# Patient Record
Sex: MALE | Race: WHITE
[De-identification: names, ages, dates, MRNs, and addresses within clinical notes are randomized per-mention and may not be internally consistent; named-entity substitution may affect disease eponyms.]

---

## 2017-12-15 ENCOUNTER — HOSPITAL ENCOUNTER (EMERGENCY)
Dept: HOSPITAL 62 - ER | Age: 42
Discharge: HOME | End: 2017-12-15
Payer: SELF-PAY

## 2017-12-15 VITALS — DIASTOLIC BLOOD PRESSURE: 60 MMHG | SYSTOLIC BLOOD PRESSURE: 128 MMHG

## 2017-12-15 DIAGNOSIS — R42: ICD-10-CM

## 2017-12-15 DIAGNOSIS — I49.3: ICD-10-CM

## 2017-12-15 DIAGNOSIS — J98.11: Primary | ICD-10-CM

## 2017-12-15 DIAGNOSIS — M42.00: ICD-10-CM

## 2017-12-15 DIAGNOSIS — R53.1: ICD-10-CM

## 2017-12-15 LAB
ABSOLUTE EOSINOPHILS# (MANUAL): 0.2 10^3/UL (ref 0–0.6)
ALBUMIN SERPL-MCNC: 4.4 G/DL (ref 3.5–5)
ALP SERPL-CCNC: 45 U/L (ref 38–126)
ALT SERPL-CCNC: 59 U/L (ref 21–72)
ANION GAP SERPL CALC-SCNC: 11 MMOL/L (ref 5–19)
APTT BLD: 25.1 SEC (ref 23.5–35.8)
AST SERPL-CCNC: 23 U/L (ref 17–59)
BASOPHILS NFR BLD MANUAL: 0 % (ref 0–2)
BILIRUB DIRECT SERPL-MCNC: 0.2 MG/DL (ref 0–0.4)
BILIRUB SERPL-MCNC: 0.2 MG/DL (ref 0.2–1.3)
BUN SERPL-MCNC: 15 MG/DL (ref 7–20)
CALCIUM: 9 MG/DL (ref 8.4–10.2)
CHLORIDE SERPL-SCNC: 102 MMOL/L (ref 98–107)
CK MB SERPL-MCNC: 2.51 NG/ML (ref ?–4.55)
CK SERPL-CCNC: 250 U/L (ref 55–170)
CO2 SERPL-SCNC: 28 MMOL/L (ref 22–30)
CREAT SERPL-MCNC: 0.98 MG/DL (ref 0.52–1.25)
EOSINOPHIL NFR BLD MANUAL: 2 % (ref 0–6)
ERYTHROCYTE [DISTWIDTH] IN BLOOD BY AUTOMATED COUNT: 13.8 % (ref 11.5–14)
GLUCOSE SERPL-MCNC: 92 MG/DL (ref 75–110)
HCT VFR BLD CALC: 41.9 % (ref 37.9–51)
HGB BLD-MCNC: 14.3 G/DL (ref 13.5–17)
HGB HCT DIFFERENCE: 1
MCH RBC QN AUTO: 30.1 PG (ref 27–33.4)
MCHC RBC AUTO-ENTMCNC: 34 G/DL (ref 32–36)
MCV RBC AUTO: 89 FL (ref 80–97)
NEUTS BAND NFR BLD MANUAL: 1 % (ref 3–5)
NRBC BLD AUTO-RTO: 1 /100 WBC
POTASSIUM SERPL-SCNC: 3.9 MMOL/L (ref 3.6–5)
PROT SERPL-MCNC: 7.5 G/DL (ref 6.3–8.2)
PROTHROMBIN TIME: 12.8 SEC (ref 11.4–15.4)
RBC # BLD AUTO: 4.73 10^6/UL (ref 4.35–5.55)
SODIUM SERPL-SCNC: 141 MMOL/L (ref 137–145)
TOTAL CELLS COUNTED BLD: 100
TOXIC GRANULES BLD QL SMEAR: SLIGHT
TROPONIN I SERPL-MCNC: < 0.012 NG/ML
VARIANT LYMPHS NFR BLD MANUAL: 35 % (ref 13–45)
WBC # BLD AUTO: 11.4 10^3/UL (ref 4–10.5)

## 2017-12-15 PROCEDURE — 36415 COLL VENOUS BLD VENIPUNCTURE: CPT

## 2017-12-15 PROCEDURE — 80053 COMPREHEN METABOLIC PANEL: CPT

## 2017-12-15 PROCEDURE — 93005 ELECTROCARDIOGRAM TRACING: CPT

## 2017-12-15 PROCEDURE — 84484 ASSAY OF TROPONIN QUANT: CPT

## 2017-12-15 PROCEDURE — 70450 CT HEAD/BRAIN W/O DYE: CPT

## 2017-12-15 PROCEDURE — 82553 CREATINE MB FRACTION: CPT

## 2017-12-15 PROCEDURE — 85025 COMPLETE CBC W/AUTO DIFF WBC: CPT

## 2017-12-15 PROCEDURE — 99285 EMERGENCY DEPT VISIT HI MDM: CPT

## 2017-12-15 PROCEDURE — 71010: CPT

## 2017-12-15 PROCEDURE — 93010 ELECTROCARDIOGRAM REPORT: CPT

## 2017-12-15 PROCEDURE — 71275 CT ANGIOGRAPHY CHEST: CPT

## 2017-12-15 PROCEDURE — 85730 THROMBOPLASTIN TIME PARTIAL: CPT

## 2017-12-15 PROCEDURE — 82550 ASSAY OF CK (CPK): CPT

## 2017-12-15 PROCEDURE — 85610 PROTHROMBIN TIME: CPT

## 2017-12-15 PROCEDURE — 74174 CTA ABD&PLVS W/CONTRAST: CPT

## 2017-12-15 NOTE — ER DOCUMENT REPORT
ED General





- General


Mode of Arrival: Ambulatory


Information source: Patient


TRAVEL OUTSIDE OF THE [place] IN LAST 30 DAYS: No





- HPI


Onset: This morning


Associated symptoms: Chest pain





<NEVIN MONTERROSO - Last Filed: 12/15/17 23:48>





<VIN ADAME - Last Filed: 12/16/17 01:20>





- General


Chief Complaint: S/S of Possible Stroke


Stated Complaint: STROKE LIKE SYMPTOMS


Time Seen by Provider: 12/15/17 19:39


Notes: 


Patient is a 42 year old male with a history of Bronchitis and spinal problems 

presents to the emergency department complaining of chest pain and weakness in 

his extremities bilaterally. Patient states at around 1730 he was beginning to 

get out of his car when his legs suddenly felt weak and he fell back into his 

car. Patient also states that he could not speak when attempting to grab someone

's attention. Patient states that he was able to walk shortly after feeling 

weak. Patient states that he went to Urgent Care first then proceeded to come 

to the emergency department. 








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 

with Bronchitis. Patient states that his legs with oftern go numb due to 

Scheurmann's Kyphosis. Patient states he feels as though he did not eat "well" 

today after having 3 donuts and chicken and pasta. 


 (NEVIN MONTERROSO)





Weakness was bilateral, no focal neurologic deficits, states that he wanted to 

speak but did not think the words would come out correctly so he did not. (

VIN ADAME)





- Related Data


Allergies/Adverse Reactions: 


 





No Known Allergies Allergy (Verified 11/09/13 17:51)


 











Past Medical History





- General


Information source: Patient





- Social History


Smoking Status: Never Smoker


Cigarette use (# per day): No


Chew tobacco use (# tins/day): No


Smoking Education Provided: No


Frequency of alcohol use: Rare


Drug Abuse: None


Family History: Reviewed & Not Pertinent


Musculoskeltal Medical History: Reports Other - Scheuermann's Kyphosis


Traumatic Medical History: Reports: Hx Fractures


Past Surgical History: Reports: Hx Oral Surgery, Hx Orthopedic Surgery - t9-t12 

fusion, L3-L5 laminectomy





- Immunizations


Hx Diphtheria, Pertussis, Tetanus Vaccination: No





<NEVIN MONTERROSO - Last Filed: 12/15/17 23:48>





Review of Systems





- Review of Systems


Constitutional: No symptoms reported


EENT: No symptoms reported


Cardiovascular: See HPI, Chest pain


Respiratory: No symptoms reported


Gastrointestinal: No symptoms reported


Genitourinary: No symptoms reported


Male Genitourinary: No symptoms reported


Musculoskeletal: No symptoms reported


Skin: No symptoms reported


Hematologic/Lymphatic: No symptoms reported


Neurological/Psychological: See HPI, Weakness


-: Yes All other systems reviewed and negative





<NEVIN MONTERROSO - Last Filed: 12/15/17 23:48>





Physical Exam





<NEVIN MONTERROSO - Last Filed: 12/15/17 23:48>





<VIN ADAME - Last Filed: 12/16/17 01:20>





- Vital signs


Vitals: 


 











Temp Pulse BP Pulse Ox


 


 98.1 F   94   157/90 H  98 


 


 12/15/17 19:23  12/15/17 19:23  12/15/17 19:23  12/15/17 19:23














- Notes


Notes: 





GENERAL: Alert, interacts well. No acute distress.


HEAD: Normocephalic, atraumatic.


EYES: Pupils equal, round, and reactive to light. Extraocular movements intact.


ENT: Oral mucosa moist, tongue midline.


NECK: Full range of motion. Supple. Trachea midline.


LUNGS: Clear to auscultation bilaterally, no wheezes, rales, or rhonchi. No 

respiratory distress.


HEART: Occasional ectopic beats. No murmurs, gallops, or rubs.


ABDOMEN: Soft, non-tender. Non-distended. Bowel sounds present in all 4 

quadrants.


EXTREMITIES: Moves all 4 extremities spontaneously. No edema, radial and 

dorsalis pedis pulses 2/4 bilaterally. No cyanosis.


NEUROLOGICAL: Alert and oriented x3. Normal speech. cranial nerves II through 

XII grossly intact. Biceps and patellar DTRs 2+ bilaterally. See NIH scale.


PSYCH: Normal affect, normal mood.


SKIN: Warm, dry, normal turgor. No rashes or lesions noted.


 (NEVIN MONTERROSO)





Course





- Laboratory


Result Diagrams: 


 12/15/17 20:15





 12/15/17 20:15





<NEVIN MONTERROSO - Last Filed: 12/15/17 23:48>





- Laboratory


Result Diagrams: 


 12/15/17 20:15





 12/15/17 20:15





<VIN ADAME - Last Filed: 12/16/17 01:20>





- Re-evaluation


Re-evalutation: 





12/15/17 23:35


CBC shows slight leukocytosis 11.4, coags normal, CMP grossly unremarkable, 

cardiac enzymes negative, CT scan of the head was initially ordered due to 

stroke protocol, this was negative for acute intracranial process, chest x-ray 

shows similar mild elevation of the right hemidiaphragm and right basilar 

scarring, no acute process.  Given the bilateral lower extremity weakness 

associated with chest pain and near syncopal episode patient did have a CT 

angiogram of the chest and abdomen looking for possible aortic dissection, this 

showed no aneurysm and no dissection, there is mild subpleural scarring and 

subsegmental atelectasis in the right lower and middle lobe.





Patient's symptoms are not consistent with stroke as they are symmetric and by 

lateral.  Discussed with patient that his symptoms more likely represent near 

syncopal episode.  Patient has not been hypoxic while he has been here, has 

evidence of atelectasis but no pneumonia.  No indication for antibiotics at 

this time.  Patient has had multiple asymptomatic PVCs, encouraged to follow-up 

with cardiologist as an outpatient for a Holter monitor.  States he has been 

told in the past that he has had PVCs however given the fact that he felt 

somewhat dizzy today I feel it is prudent to have this rechecked.





Patient will be discharged home with incentive spirometer.  No evidence of 

acute coronary syndrome, patient's chest pain he related was directly related 

to a coughing episode.  EKG is nonischemic. (VIN ADAME)





- Vital Signs


Vital signs: 


 











Temp Pulse Resp BP Pulse Ox


 


 98.1 F   81   20   128/60 H  100 


 


 12/15/17 19:23  12/15/17 22:45  12/15/17 23:31  12/15/17 23:31  12/15/17 23:31














- Laboratory


Laboratory results interpreted by me: 


 











  12/15/17 12/15/17





  20:15 20:15


 


WBC  11.4 H 


 


Band Neutrophils %  1 L 


 


Creatine Kinase   250 H














- EKG Interpretation by Me


Additional EKG results interpreted by me: 





12/15/17 23:37


EKG shows sinus rhythm rate 91, multiple PVCs, no ST segment elevations or 

depressions, isolated T-wave inversions in lead III, normal axis, normal 

intervals per my interpretation. (VIN ADAME)





Discharge





<NEVIN MONTERROSO - Last Filed: 12/15/17 23:48>





<VIN ADAME - Last Filed: 12/16/17 01:20>





- Discharge


Clinical Impression: 


 Dizziness, Atelectasis of right lung, PVC (premature ventricular contraction)





Condition: Stable


Disposition: HOME, SELF-CARE


Additional Instructions: 


You have atelectasis of your right middle and lower lobe.  This is likely 

related to your prior surgery and your kyphosis.  It may be worsening some of 

your dizziness today.  There is no sign of aortic dissection or pneumonia.  

Please make sure that you use the incentive spirometer to take 10 deep breaths 

every hour while you are awake until your cough resolves.  Should your cough 

worsen, you develop a fever or you pass out please return to the emergency 

department.





For your premature ventricular contractions (PVC) please follow-up with a 

cardiologist as an outpatient.


Referrals: 


WILLIAM HERNANDEZ MD [Primary Care Provider] - Follow up as needed


GIANNA YEE MD [ACTIVE STAFF] - Follow up as needed


Scribe Attestation: 





12/16/17 01:20


I personally performed the services described in the documentation, reviewed 

and edited the documentation which was dictated to the scribe in my presence, 

and it accurately records my words and actions. (VIN ADAME)





ED NIH Stroke Scale





- NIH Stroke Scale


*: 1. NIH scale should be completed with appropriate accompanying assessment 

tools.


*: 2. The NIH should reflect what the patient is capable of doing and should 

not be coached by the clinician.


1a. Level of Consciousness: 0=Alert;keenly responsive


-: 1=Drowsy


-: 2=Obtunded


-: 3=Coma/unresponsive or reflex to noxious stimuli.


1a. Responses: 0


1b. Orientation Questions: a. What month is it?


-: b. How old are you?


-: 0=Answers both questions correctly.


-: 1=Answers one question correctly or patient is intubated or has orotracheal 

trauma.


-: 2=Answers neither question correctly.


1b. Responses: 0


1c. Response to commands: a. Open and close eyes?


-: b.  and release hand?


-: Credit is given despite weakness. Demonstration of task is permitted. 

Substitute command if hands cannot be used.


-: 0=Performs both tasks correctly


-: 1=Performs one task correctly


-: 2=Performs neither task correctly


1c. Responses: 0


2. Gaze: Establish eye contact and instruct patient to "Follow my finger"


-: 0=Normal


-: 1=Partial gaze palsy. Gaze is abnormal in one or both eyes, but where forced 

deviation or total gaze paresis is not present.


-: 2=Forced deviation or total gaze paresis.


2. Responses: 0


3. Visual Fields: Sees fingers in all four quadrants.


-: 0=No visual loss.


-: 1=Partial hemianopsia.


-: 2=Complete hemianopsia.


-: 3=Bilateral hemianopsia (including Cortical blindness)


3. Responses: 0


4. Facial Movement: Instruct patient to:


-: a. Show me your teeth


-: b. Raise your eyebrows


-: c. Close your eyes


-: d. Smile


-: 0=Normal symmetrical movement


-: 1=Minor paralysis (flattened nasolabial fold, asymmetry on smiling).


-: 2=Partial paralysis (total or near total paralysis of lower face).


-: 3=Complete paralysis of upper and lower face


4. Responses: 0


5. Motor functions (left arm): Alternate sides and extend each arm with palms 

down (90 degrees if sitting or 45 degrees for supine).


-: 0=No drift;limb holds for full 10 seconds.


-: 1=Drift; limb holds but drifts down before full 10 seconds, but does not hit 

bed.


-: 2=Some effort against gravity; limb cannot get to or maintain position.


-: 3=No effort against gravity; limb falls.


-: 4=No movement.


-: UN=Amputation, joint fusion, explain in comments.


5. Responses (left arm): 0


5. Motor Functions (right arm): Alternate sides and extend each arm with palms 

down (90 degrees if sitting or 45 degrees for supine).


-: 0=No drift;limb holds for full 10 seconds.


-: 1=Drift; limb holds but drifts down before full 10 seconds, but does not hit 

bed.


-: 2=Some effort against gravity; limb cannot get to or maintain position.


-: 3=No effort against gravity; limb falls.


-: 4=No movement.


-: UN=Amputation, joint fusion, explain in comments.


5. Responses (right arm): 0


6. Motor Functions (left leg): With patient lying supine, alternate sides and 

extend each leg (30 degrees always while supine).


-: 0=No drift, leg holds position for full 5 seconds


-: 1=Drift; leg falls before full 5 seconds but does not hit bed.


-: 2=Some effort against gravity, leg falls to bed but some effort against 

gravity.


-: 3=No effort against gravity, leg falls to bed immediately.


-: 4=No movement.


-: UN=Amputation, joint fusion; explain in comments.


6. Responses (left leg): 0


6. Motor Functions (right leg): With patient lying supine, alternate sides and 

extend each leg (30 degrees always while supine).


-: 0=No drift, leg holds position for full 5 seconds


-: 1=Drift; leg falls before full 5 seconds but does not hit bed.


-: 2=Some effort against gravity, leg falls to bed but some effort against 

gravity.


-: 3=No effort against gravity, leg falls to bed immediately.


-: 4=No movement.


-: UN=Amputation, joint fusion; explain in comments.


6. Responses (right leg): 0


7. Limb Ataxia: With eyes open instruct patient to:


-: a. "Touch your finger to your nose".


-: b. "Touch your heel to your shin"


-: 0=Absent


-: 1=Present in one limb.


-: 2=Present in two limbs.


-: UN=Amputation or joint fusion; explain in comments.


7. Responses: 0


8. Sensory: Test sensation using pinprick or noxious stimuli.  Test as many 

body parts as possible.


-: 0=Normal;no sensory loss


-: 1=Mile to moderate sensory loss (patient feels pin prick but is less sharp 

on affected side).


-: 2=Severe or total sensory loss.


8. Responses: 0


9. Best Language: Instruct patient to:


-: a. "Describe what you see in this picture."


-: b. "Name the items in this picture."


-: c. "Read these sentences."


-: 0=No aphasia, normal


-: 1=Mild to moderate aphasia.


-: 2=Severe aphasia


-: 3=Mute, global aphasia, no usable speech or auditory comprehension.


9. Responses: 0


10. Articulation, Dysarthia: Instruct patient to:


-: "Read these words" or "Repeat these words"


-: 0=Normal


-: 1=Mild to moderate; patient may slur some words but can be understood 

without difficulty.


-: 2=Severe; patients speech so slurred as to be unintelligible in the absence 

of dysphasia.


-: UN=Intubated or other physical barrier, explain in comments.


10. Responses: 0


11. Extinction or inattention: 0=No abnormality


-: 1= Visual, tactile, auditory, spatial, or personal inattention or extinction 

to bilateral simulation in one or the sensory modalities.


-: 2=Profound howard-inattention or howard-inattention to more than one modality; 

does not recognize own hand.


11. Responses: 0


Total Score: 0





<NEVIN MONTERROSO - Last Filed: 12/15/17 23:48>





ED Alteplase Inc/Exc Criteria





- Date/Time patient last known well:


Date/Time: 12/15/2017, Unknown





- Date/Time patient arrived in ED:


_: 12/15/2017, 19:20





- Inclusion Criteria:


1: Patient presented to ED within 3 hours of acute ischemic stroke symptom onset

?


-: Yes


2: Did baseline CT exclude intracranial hemorrhage and/or other risk factors?


-: No


3: Is the age of the patient 18 years of age or greater?


-: Yes


*****: If any of the above questions are answered "NO" then stop, patient is 

not a candidate for Alteplase,


*****: If all of the above questions are answered "YES" then continue with 

Exclusion Criteria.





- Diagnosis of TIA:


-: Patient presented with transient symptoms that are now resolved and no other 

neurologic findings are currently present. List symptoms in comments.


-: Yes


-: Patient is NOT a candidate for tPA.


-: Yes


-:  ____(put name in comment)______ has been consulted for admission and 

continued evaluation of risk factor assessment.





<NEVIN MONTERROSO - Last Filed: 12/15/17 23:48>





- Inclusion Criteria:


1: Patient presented to ED within 3 hours of acute ischemic stroke symptom onset

?


2: Did baseline CT exclude intracranial hemorrhage and/or other risk factors?


-: Yes - correction to scribe documentation, answer is yes


3: Is the age of the patient 18 years of age or greater?


*****: If any of the above questions are answered "NO" then stop, patient is 

not a candidate for Alteplase,


*****: If all of the above questions are answered "YES" then continue with 

Exclusion Criteria.





- Exclusion Criteria:


1: Is there evidence of intracranial hemorrhage on baseline CT?


-: No


2: Is there suspicion of subarachnoid hemorrhage (even if CT negative)?


-: No


3: Is there a history of serious head trauma, recent previous stroke or MI 

within 3 months?


-: No


4: Does the patient have a clinical presentation consistent with MI or post-MI 

pericarditis?


-: No


5: Is there history of intracranial hemorrhage?


-: No


6: On repeated measurement is Systolic BP greater than 185mmHg or Diastolic BP 

greater that 110 mmHg and is aggressive treatment needed to reduce blood 

pressure to these limits (e.g. constant infusion of an anti-hypertensive)?


-: No


7: Did the patient awake with stroke symptoms?


-: No


8: Has the patient had a lumbar puncture or an arterial puncture at a non-

compressile site within 7 days?


-: No


9: With in the last 14 days did the patient have surgery or major trauma?


-: No


10: Is the patient pregnant or postpartum less than 2 weeks?


-: No


11: Was there any active bleeding or acute trauma?


-: No


12: Does the patient have intracranial neoplasm, arteriovenous malformation or 

aneurysm?


-: No


13: Does the patient have abnormal glucose (less than 50 or greater than 400mg/

dl)?  Record glucose in Comment.


-: No


14: Patient has rapidly improving symptoms at the time Alteplase is to be 

Administered.


-: Yes


15: Does the patient have any risks for bleeding, including but not limited to:


a.: Current use of Coumadin with PT greater than 15 seconds or INR greater than 

1.7.


b.: Current use of Pradaxa (Dabigatran).


c.: Heparin administereed within the past 48 hours and PTT elevated.


d.: Platelet count less than 100,000/mm.


e.: Major surgery or serious trauma within 14 days.


f.: Gastrointestinal or gynecological urinary bleeding within 14 days.


g.: Myocardial Infarction (MI) within 3 months.


-: No


*****: If the answer to any of the above questions is "YES" then stop, the 

patient is not a candidate for Alteplase.


*****: If the answer to all of the above questions is "NO" then the patient may 

be eligible for the Administration of Alteplase.


*****: If the patient is noted to have seizure activity at onset of Stroke 

symptoms; Consult Neurologist for further evaluation.





- The patient is:


-: Included and is eligible to receive Alteplase.  *Initiate bed placement at 

higher level of care*


--: No


Reviewd risks & benefits of thrombolytic therapy: I have reviewed the risks and 

benefits of thrombolytic therapy with the patient and/or his/her family.


-: Excluded and not eligible to receive Alteplase for the above exclusions.


--: Yes


-: Excluded and not eligible to receive Alteplase for other reasons (specify in 

comments):





- Diagnosis of TIA:


-: Patient presented with transient symptoms that are now resolved and no other 

neurologic findings are currently present. List symptoms in comments.


-: Patient is NOT a candidate for tPA.


-: Yes


-:  ____(put name in comment)______ has been consulted for admission and 

continued evaluation of risk factor assessment.


Comment: symptoms not consistent with stroke or TIA.  





<VIN ADAME - Last Filed: 12/16/17 01:20>





Scribe Documentation





- Scribe


Written by Wu:: Wu Villela, 12/15/2017 20:44


acting as scribe for :: [employer]





<NEVIN MONTERROSO - Last Filed: 12/15/17 23:48>

## 2017-12-15 NOTE — RADIOLOGY REPORT (SQ)
EXAM DESCRIPTION:  CTA CHEST; CTA ABDOMEN/PELVIS W   WO



COMPLETED DATE/TIME:  12/15/2017 9:42 pm



REASON FOR STUDY:  rule out dissection



COMPARISON:  None.



TECHNIQUE:  CT scan of the abdominal aorta extending to the iliac bifurcation performed with and with
out intravenous contrast using helical scanning technique with dynamic intravenous contrast injection
. Images reviewed with lung, soft tissue, and bone windows. Reconstructed coronal and sagittal MPR im
ages reviewed. All images stored on PACS.

Advanced 3D imaging as volume rendering, MIPS, SSD performed? yes

All CT scanners at this facility use dose modulation, iterative reconstruction, and/or weight based d
osing when appropriate to reduce radiation dose to as low as reasonably achievable (ALARA).

[facility]: Dose Right  [facility]: CareDose    [facility]: Dose Right    [facility]: Teradose 4D    [facility]: Smart Technologies



CONTRAST TYPE AND DOSE:  contrast/concentration: Isovue 370.00 mg/ml; Total Contrast Delivered: 75.0 
ml; Total Saline Delivered: 50.0 ml



RENAL FUNCTION:  GFR > 60.



LIMITATIONS:  None.



FINDINGS:  AORTA AND VESSELS: No aneurysm. No dissection. Renal arteries, SMA, celiac without stenosi
s.

LUNGS: Mild subpleural scarring-subsegmental atelectasis in the right lower lobe and middle lobe.  No
 consolidation or pleural effusion.

LIVER: Moderate fatty infiltration.  Normal size. No masses or dilated ducts.

SPLEEN: Normal size. No focal lesions.

PANCREAS: No masses. No significant calcifications. No adjacent inflammation or peripancreatic fluid 
collections. Pancreatic duct not dilated.

GALLBLADDER: No identified stones by CT criteria. No inflammatory changes to suggest cholecystitis.

ADRENAL GLANDS: No significant masses or asymmetry.

RIGHT KIDNEY AND URETER: No mass, calculi or urinary tract obstruction.

LEFT KIDNEY AND URETER: No mass, calculi or urinary tract obstruction.

RETROPERITONEUM: No retroperitoneal adenopathy, hemorrhage or masses.

BOWEL AND PERITONEAL CAVITY: No masses or inflammatory changes. No free fluid or peritoneal masses.

APPENDIX: Normal.

ABDOMINAL WALL: No masses. No hernias.

BONY STRUCTURES: No acute findings.  Multilevel posterior fusion hardware in the lower thoracic spine
.

3-D IMAGING: Confirms the above findings.

OTHER: No other significant finding.



IMPRESSION:  No aneurysm. No dissection.Mild subpleural scarring-subsegmental atelectasis in the righ
t lower lobe and middle lobe. No consolidation or pleural effusion.

No acute findings in the abdomen or pelvis.



TECHNICAL DOCUMENTATION:  JOB ID:  [id number]

[id number]

Quality ID # 436: Final reports with documentation of one or more dose reduction techniques (e.g., Au
tomated exposure control, adjustment of the mA and/or kV according to patient size, use of iterative 
reconstruction technique)

 2011 [employer]- All Rights Reserved

## 2017-12-15 NOTE — RADIOLOGY REPORT (SQ)
EXAM DESCRIPTION:  CTA CHEST; CTA ABDOMEN/PELVIS W   WO



COMPLETED DATE/TIME:  12/15/2017 9:42 pm



REASON FOR STUDY:  rule out dissection



COMPARISON:  None.



TECHNIQUE:  CT scan of the abdominal aorta extending to the iliac bifurcation performed with and with
out intravenous contrast using helical scanning technique with dynamic intravenous contrast injection
. Images reviewed with lung, soft tissue, and bone windows. Reconstructed coronal and sagittal MPR im
ages reviewed. All images stored on PACS.

Advanced 3D imaging as volume rendering, MIPS, SSD performed? yes

All CT scanners at this facility use dose modulation, iterative reconstruction, and/or weight based d
osing when appropriate to reduce radiation dose to as low as reasonably achievable (ALARA).

[facility]: Dose Right  [facility]: CareDose    [facility]: Dose Right    [facility]: Teradose 4D    [facility]: Smart Technologies



CONTRAST TYPE AND DOSE:  contrast/concentration: Isovue 370.00 mg/ml; Total Contrast Delivered: 75.0 
ml; Total Saline Delivered: 50.0 ml



RENAL FUNCTION:  GFR > 60.



LIMITATIONS:  None.



FINDINGS:  AORTA AND VESSELS: No aneurysm. No dissection. Renal arteries, SMA, celiac without stenosi
s.

LUNGS: Mild subpleural scarring-subsegmental atelectasis in the right lower lobe and middle lobe.  No
 consolidation or pleural effusion.

LIVER: Moderate fatty infiltration.  Normal size. No masses or dilated ducts.

SPLEEN: Normal size. No focal lesions.

PANCREAS: No masses. No significant calcifications. No adjacent inflammation or peripancreatic fluid 
collections. Pancreatic duct not dilated.

GALLBLADDER: No identified stones by CT criteria. No inflammatory changes to suggest cholecystitis.

ADRENAL GLANDS: No significant masses or asymmetry.

RIGHT KIDNEY AND URETER: No mass, calculi or urinary tract obstruction.

LEFT KIDNEY AND URETER: No mass, calculi or urinary tract obstruction.

RETROPERITONEUM: No retroperitoneal adenopathy, hemorrhage or masses.

BOWEL AND PERITONEAL CAVITY: No masses or inflammatory changes. No free fluid or peritoneal masses.

APPENDIX: Normal.

ABDOMINAL WALL: No masses. No hernias.

BONY STRUCTURES: No acute findings.  Multilevel posterior fusion hardware in the lower thoracic spine
.

3-D IMAGING: Confirms the above findings.

OTHER: No other significant finding.



IMPRESSION:  No aneurysm. No dissection.Mild subpleural scarring-subsegmental atelectasis in the righ
t lower lobe and middle lobe. No consolidation or pleural effusion.

No acute findings in the abdomen or pelvis.



TECHNICAL DOCUMENTATION:  JOB ID:  [id number]

[id number]

Quality ID # [id number]: Final reports with documentation of one or more dose reduction techniques (e.g., Au
tomated exposure control, adjustment of the mA and/or kV according to patient size, use of iterative 
reconstruction technique)

 2011 [employer]- All Rights Reserved

## 2017-12-15 NOTE — RADIOLOGY REPORT (SQ)
EXAM DESCRIPTION:  CT HEAD WITHOUT



COMPLETED DATE/TIME:  12/15/2017 7:39 pm



REASON FOR STUDY:  STROKE ALERT



COMPARISON:  11/9/2013



TECHNIQUE:  Axial images acquired through the brain without intravenous contrast.  Images reviewed wi
th bone, brain and subdural windows.  Images stored on PACS.

All CT scanners at this facility use dose modulation, iterative reconstruction, and/or weight based d
osing when appropriate to reduce radiation dose to as low as reasonably achievable (ALARA).

[facility]: Dose Right  [facility]: CareDose    [facility]: Dose Right    [facility]: Teradose 4D    [facility]: Smart Medmonk



RADIATION DOSE:  CT Rad equipment meets quality standard of care and radiation dose reduction techniq
ues were employed. CTDIvol: 64.6 mGy. DLP: 1163 mGy-cm. mGy.



LIMITATIONS:  None.



FINDINGS:  VENTRICLES: Normal size and contour.

CEREBRUM: No masses.  No hemorrhage.  No midline shift.  No evidence for acute infarction. Normal gra
y/white matter differentiation. No areas of low density in the white matter.

CEREBELLUM: No masses.  No hemorrhage.  No alteration of density.  No evidence for acute infarction.

EXTRAAXIAL SPACES: No fluid collections.  No masses.

ORBITS AND GLOBE: No intra- or extraconal masses.  Normal contour of globe without masses.

CALVARIUM: No fracture.

PARANASAL SINUSES: Mild right maxillary sinus mucosal thickening.

SOFT TISSUES: No mass or hematoma.

OTHER: No other significant finding.



IMPRESSION:  No acute intracranial findings.

EVIDENCE OF ACUTE STROKE: NO.



COMMENT:  Results called to the emergency room physician at 1940 hours.  Results were confirmed and r
ead back.

Quality ID # [id number]: Final reports with documentation of one or more dose reduction techniques (e.g., Au
tomated exposure control, adjustment of the mA and/or kV according to patient size, use of iterative 
reconstruction technique)



TECHNICAL DOCUMENTATION:  JOB ID:  [id number]

[id number]

 2011 [employer]- All Rights Reserved

## 2017-12-15 NOTE — RADIOLOGY REPORT (SQ)
EXAM DESCRIPTION:  CHEST SINGLE VIEW



COMPLETED DATE/TIME:  12/15/2017 7:40 pm



REASON FOR STUDY:  stroke alert



COMPARISON:  11/9/2013



EXAM PARAMETERS:  NUMBER OF VIEWS: One view.

TECHNIQUE: Single frontal radiographic view of the chest acquired.

RADIATION DOSE: NA

LIMITATIONS: None.



FINDINGS:  LUNGS AND PLEURA: No acute opacities, masses or pneumothorax.  Similar mild elevation of t
he right hemidiaphragm and right basilar scarring.  .  No pleural effusion.

MEDIASTINUM AND HILAR STRUCTURES: Stable.

HEART AND VASCULAR STRUCTURES: Heart normal in size.  Normal vasculature.

BONES: No acute findings.

HARDWARE: Lower thoracic hardware appears intact.

OTHER: No other significant finding.



IMPRESSION:  NO ACUTE RADIOGRAPHIC FINDING IN THE CHEST. Similar mild elevation of the right hemidiap
hragm and right basilar scarring.



TECHNICAL DOCUMENTATION:  JOB ID:  [id number]

[id number]

 2011 [employer]- All Rights Reserved

## 2017-12-15 NOTE — EKG REPORT
SEVERITY:- ABNORMAL ECG -

SINUS RHYTHM

MULTIPLE VENTRICULAR PREMATURE COMPLEXES

PROBABLE LEFT ATRIAL ABNORMALITY

:

Confirmed by: Abbey Martínez 15-Dec-2017 21:03:04

## 2018-11-29 ENCOUNTER — HOSPITAL ENCOUNTER (EMERGENCY)
Dept: HOSPITAL 62 - ER | Age: 43
Discharge: HOME | End: 2018-11-29
Payer: COMMERCIAL

## 2018-11-29 VITALS — DIASTOLIC BLOOD PRESSURE: 63 MMHG | SYSTOLIC BLOOD PRESSURE: 130 MMHG

## 2018-11-29 DIAGNOSIS — R53.81: ICD-10-CM

## 2018-11-29 DIAGNOSIS — R05: ICD-10-CM

## 2018-11-29 DIAGNOSIS — R07.89: ICD-10-CM

## 2018-11-29 DIAGNOSIS — J02.9: Primary | ICD-10-CM

## 2018-11-29 PROCEDURE — 71046 X-RAY EXAM CHEST 2 VIEWS: CPT

## 2018-11-29 PROCEDURE — 99283 EMERGENCY DEPT VISIT LOW MDM: CPT

## 2018-11-29 NOTE — RADIOLOGY REPORT (SQ)
EXAM DESCRIPTION:  CHEST 2 VIEWS



COMPLETED DATE/TIME:  11/29/2018 6:14 pm



REASON FOR STUDY:  Chest pain



COMPARISON:  12/15/2017



EXAM PARAMETERS:  NUMBER OF VIEWS: two views

TECHNIQUE: Digital Frontal and Lateral radiographic views of the chest acquired.

RADIATION DOSE: NA

LIMITATIONS: none



FINDINGS:  LUNGS AND PLEURA: Chronic changes at the right base.  Left lung clear.

MEDIASTINUM AND HILAR STRUCTURES: No masses or contour abnormalities.

HEART AND VASCULAR STRUCTURES: Heart normal size.  No evidence for failure.

BONES: No acute findings.

HARDWARE: Extensive spine hardware.

OTHER: No other significant finding.



IMPRESSION:  NO ACUTE RADIOGRAPHIC FINDING IN THE CHEST.



TECHNICAL DOCUMENTATION:  JOB ID:  [id number]

 2011 [employer]- All Rights Reserved



Reading location - IP/workstation name: [email or web address]

## 2018-11-29 NOTE — ER DOCUMENT REPORT
ED Respiratory Problem





- General


Chief Complaint: Cough


Stated Complaint: RIGHT SIDE PAIN


Time Seen by Provider: 11/29/18 17:50


Mode of Arrival: Ambulatory


Information source: Patient


Notes: 





History of Present Illness








Chief Complaint: [cough]





Cough quality= [dry], [without] sputum


[No] hemoptysis





[   43 years old male with extensive history presents today with cough and 

right chest wall pain.  Particularly in the lower region.  This been going on 

for the last 3-4 days.  No difficulty in breathing or wheezing.  Had general 

malaise this morning.  And also had sore throat.  No fever chills or other 

constitutional symptoms]





History obtained from [patient]


Symptoms began: [past few days]


Onset: [gradual]


Timing: [constant, lasts hours, persists]


Intensity: [moderate]


Location: [respiratory tract]


Radiation: [none]


Migration: [none]


Aggravating factors: [none]


Relieving factors: [none]








Review of Systems : All other systems negative as reviewed.


CONSTITUTIONAL 


No Fever.


EYES 


No eye pain.


ENT 


No sore throat


CARDIOVASCULAR 


No chest pain.


RESPIRATORY 


No SOB, No wheezing, No orthopnea, No pedal edema.


GI 


No abdominal pain, no vomiting, no diarrhea.


GENITOURINARY 


No dysuria.


SKIN 


No rash.


NEUROLOGIC 


No headache.


MUSCULOSKELETAL 


No back pain, No calf pain, No calf swelling





Physical Exam


CONSTITUTIONAL 


Vital signs reviewed, Patient has normal respiratory rate, Well appearing, 

Patient appears comfortable, normal stature.


HEAD 


Atraumatic, Normocephalic.


EYES 


Eyes are normal to inspection.


ENT 


Ears normal to inspection, Nose examination normal.


NECK 


No jugular venous distention.


RESPIRATORY CHEST 


Chest wall tenderness noted on the right side of the chest.


Breath sounds [normal], No respiratory distress.


CARDIOVASCULAR 


RRR, No murmurs, Normal S1 S2, No rub, No gallop.


ABDOMEN 


Abdomen is nontender, No masses, Bowel sounds normal, No distension, No 

peritoneal signs.


BACK 


Normal inspection.


UPPER EXTREMITY 


Inspection normal.


LOWER EXTREMITY 


Inspection normal.


NEURO 


No facial droop, normal speech.


SKIN 


Skin is warm, Skin is dry, Skin is normal color.


PSYCHIATRIC 


Normal affect.


TRAVEL OUTSIDE OF THE [place] IN LAST 30 DAYS: No





- HPI


Notes: 





Dictated





- Related Data


Allergies/Adverse Reactions: 


 





No Known Allergies Allergy (Verified 11/29/18 17:46)


 








Home Medications: bystolic.  metformin.  fenofibrate.  atorvastatin.  celexa.  

alprazolam.  asa.  zyrtec





Past Medical History





- Social History


Smoking Status: Never Smoker


Chew tobacco use (# tins/day): No


Frequency of alcohol use: None


Drug Abuse: None


Lives with: Family


Family History: Reviewed & Not Pertinent


Patient has suicidal ideation: No


Patient has homicidal ideation: No





- Past Medical History


Cardiac Medical History: 


   Denies: Hx Atrial Fibrillation, Hx Congestive Heart Failure, Hx Coronary 

Artery Disease, Hx Heart Attack, Hx Hypercholesterolemia, Hx Hypertension, Hx 

Peripheral Vascular Disease, Hx Pulmonary Embolism, Hx Heart Murmur


Pulmonary Medical History: 


   Denies: Hx Asthma, Hx Bronchitis, Hx COPD, Hx Pneumonia, Hx Respiratory 

Failure, Hx Sleep Apnea, Hx Tuberculosis


Neurological Medical History: Denies: Hx Cerebrovascular Accident, Hx Seizures


Endocrine Medical History: Denies: Hx Graves' Disease, Hx Hyperthyroidism, Hx 

Hypothyroidism


Renal/ Medical History: Denies: Hx Benign Prostatic Hyperplasia, Hx End Stage 

Renal Disease, Hx Kidney Stones, Hx Peritoneal Dialysis


Malignancy Medical History: Denies Hx Leukemia, Denies Hx Lung Cancer


GI Medical History: Denies: Hx Crohn's Disease, Hx Gastroesophageal Reflux 

Disease, Hx Hiatal Hernia, Hx Irritable Bowel, Hx Liver Failure, Hx Pancreatitis

, Hx Ulcer


Musculoskeletal Medical History: Denies Hx Arthritis, Denies Hx Fibromyalgia, 

Denies Hx Multiple Sclerosis, Denies Hx Muscular Dystrophy


Psychiatric Medical History: 


   Denies: Hx Bipolar Disorder, Hx Dementia, Hx Depression, Hx Post Traumatic 

Stress Disorder, Hx Schizophrenia


Traumatic Medical History: Reports: Hx Fractures


Infectious Medical History: Denies: Hx HIV


Past Surgical History: Reports: Hx Oral Surgery, Hx Orthopedic Surgery - t9-t12 

fusion, L3-L5 laminectomy.  Denies: Hx Appendectomy, Hx Bowel Surgery, Hx 

Cholecystectomy, Hx Colostomy, Hx Coronary Artery Bypass Graft, Hx Gastric 

Bypass Surgery, Hx Herniorrhaphy, Hx Pacemaker, Hx Tonsillectomy





- Immunizations


Hx Diphtheria, Pertussis, Tetanus Vaccination: No





Review of Systems





- Review of Systems


Notes: 





Dictated





Physical Exam





- Vital signs


Vitals: 





 











Temp Pulse Resp BP Pulse Ox


 


 98.8 F   80   18   130/63 H  97 


 


 11/29/18 17:14  11/29/18 17:14  11/29/18 17:14  11/29/18 17:14  11/29/18 17:14














- Notes


Notes: 





Dictated





Course





- Vital Signs


Vital signs: 





 











Temp Pulse Resp BP Pulse Ox


 


 98.8 F   80   18   130/63 H  97 


 


 11/29/18 17:14  11/29/18 17:14  11/29/18 17:14  11/29/18 17:14  11/29/18 17:14














- Diagnostic Test


Radiology reviewed: Reports reviewed - Chest x-ray reported by radiologist as 

no new finding.  Normal





Discharge





- Discharge


Clinical Impression: 


 Cough, Chest wall pain





Pharyngitis


Qualifiers:


 Pharyngitis/tonsillitis etiology: unspecified etiology Qualified Code(s): 

J02.9 - Acute pharyngitis, unspecified





Condition: Fair


Disposition: HOME, SELF-CARE


Instructions:  Chest Wall Pain ([facility])


Prescriptions: 


Azithromycin [Zithromax 250 mg Tablet] 250 mg PO ASDIR PRN #6 tablet


 PRN Reason: 


Hydrocodone Bit/Acetaminophen [Hydrocodon-Acetaminophen 5-325] 1 each PO TID #

10 tablet


Hydrocodone/Chlorphen P-Stirex [Tussionex Pennkinetic Susp] 5 ml PO BID #115 

[email or web address]


Referrals: 


WILLIAM HERNANDEZ MD [Primary Care Provider] - Follow up as needed

## 2020-01-22 ENCOUNTER — HOSPITAL ENCOUNTER (EMERGENCY)
Dept: HOSPITAL 62 - ER | Age: 45
Discharge: HOME | End: 2020-01-22
Payer: COMMERCIAL

## 2020-01-22 VITALS — SYSTOLIC BLOOD PRESSURE: 122 MMHG | DIASTOLIC BLOOD PRESSURE: 84 MMHG

## 2020-01-22 DIAGNOSIS — I49.3: ICD-10-CM

## 2020-01-22 DIAGNOSIS — M25.512: ICD-10-CM

## 2020-01-22 DIAGNOSIS — G89.29: ICD-10-CM

## 2020-01-22 DIAGNOSIS — R07.9: Primary | ICD-10-CM

## 2020-01-22 DIAGNOSIS — Z87.891: ICD-10-CM

## 2020-01-22 LAB
ADD MANUAL DIFF: NO
ALBUMIN SERPL-MCNC: 4.8 G/DL (ref 3.5–5)
ALP SERPL-CCNC: 41 U/L (ref 38–126)
ANION GAP SERPL CALC-SCNC: 12 MMOL/L (ref 5–19)
APPEARANCE UR: CLEAR
APTT PPP: YELLOW S
AST SERPL-CCNC: 37 U/L (ref 17–59)
BASOPHILS # BLD AUTO: 0 10^3/UL (ref 0–0.2)
BASOPHILS NFR BLD AUTO: 0.7 % (ref 0–2)
BILIRUB DIRECT SERPL-MCNC: 0.3 MG/DL (ref 0–0.4)
BILIRUB SERPL-MCNC: 0.4 MG/DL (ref 0.2–1.3)
BILIRUB UR QL STRIP: NEGATIVE
BUN SERPL-MCNC: 13 MG/DL (ref 7–20)
CALCIUM: 9.7 MG/DL (ref 8.4–10.2)
CHLORIDE SERPL-SCNC: 102 MMOL/L (ref 98–107)
CO2 SERPL-SCNC: 27 MMOL/L (ref 22–30)
EOSINOPHIL # BLD AUTO: 0.2 10^3/UL (ref 0–0.6)
EOSINOPHIL NFR BLD AUTO: 3.2 % (ref 0–6)
ERYTHROCYTE [DISTWIDTH] IN BLOOD BY AUTOMATED COUNT: 13.3 % (ref 11.5–14)
GLUCOSE SERPL-MCNC: 107 MG/DL (ref 75–110)
GLUCOSE UR STRIP-MCNC: NEGATIVE MG/DL
HCT VFR BLD CALC: 38.5 % (ref 37.9–51)
HGB BLD-MCNC: 13.1 G/DL (ref 13.5–17)
KETONES UR STRIP-MCNC: NEGATIVE MG/DL
LYMPHOCYTES # BLD AUTO: 2.1 10^3/UL (ref 0.5–4.7)
LYMPHOCYTES NFR BLD AUTO: 36.7 % (ref 13–45)
MCH RBC QN AUTO: 30.4 PG (ref 27–33.4)
MCHC RBC AUTO-ENTMCNC: 34.1 G/DL (ref 32–36)
MCV RBC AUTO: 89 FL (ref 80–97)
MONOCYTES # BLD AUTO: 0.4 10^3/UL (ref 0.1–1.4)
MONOCYTES NFR BLD AUTO: 6.5 % (ref 3–13)
NEUTROPHILS # BLD AUTO: 3.1 10^3/UL (ref 1.7–8.2)
NEUTS SEG NFR BLD AUTO: 52.9 % (ref 42–78)
NITRITE UR QL STRIP: NEGATIVE
PH UR STRIP: 8 [PH] (ref 5–9)
PLATELET # BLD: 259 10^3/UL (ref 150–450)
POTASSIUM SERPL-SCNC: 4 MMOL/L (ref 3.6–5)
PROT SERPL-MCNC: 8.3 G/DL (ref 6.3–8.2)
PROT UR STRIP-MCNC: NEGATIVE MG/DL
RBC # BLD AUTO: 4.32 10^6/UL (ref 4.35–5.55)
SP GR UR STRIP: 1.01
TOTAL CELLS COUNTED % (AUTO): 100 %
UROBILINOGEN UR-MCNC: NEGATIVE MG/DL (ref ?–2)
WBC # BLD AUTO: 5.8 10^3/UL (ref 4–10.5)

## 2020-01-22 PROCEDURE — 81001 URINALYSIS AUTO W/SCOPE: CPT

## 2020-01-22 PROCEDURE — 36415 COLL VENOUS BLD VENIPUNCTURE: CPT

## 2020-01-22 PROCEDURE — 84484 ASSAY OF TROPONIN QUANT: CPT

## 2020-01-22 PROCEDURE — 85025 COMPLETE CBC W/AUTO DIFF WBC: CPT

## 2020-01-22 PROCEDURE — 71046 X-RAY EXAM CHEST 2 VIEWS: CPT

## 2020-01-22 PROCEDURE — 93005 ELECTROCARDIOGRAM TRACING: CPT

## 2020-01-22 PROCEDURE — 80053 COMPREHEN METABOLIC PANEL: CPT

## 2020-01-22 PROCEDURE — 99285 EMERGENCY DEPT VISIT HI MDM: CPT

## 2020-01-22 PROCEDURE — 93010 ELECTROCARDIOGRAM REPORT: CPT

## 2020-01-22 NOTE — ER DOCUMENT REPORT
ED Medical Screen (RME)





- General


Chief Complaint: Chest Pain > 30


Stated Complaint: CHEST PAIN


Time Seen by Provider: 01/22/20 15:59


Primary Care Provider: 


WILLIAM HERNANDEZ MD [Primary Care Provider] - Follow up as needed


Mode of Arrival: Ambulatory


Information source: Patient


Notes: 





44-year-old male presented to ED for complaint of chest pain for about 2 hours. 

He states he waited a little while before he coughed.  He has a history of PVCs 

high cholesterol he had a heart cath last January and which they found some 

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  He states that they told him he had some current 

defect and in order to fix able to help with 2 mini stents and but it was some 

kind of birth defect so they were supposed to get a second opinion in February 6

in [place].  He has been taking medicines to slow his heartbeat down since 

he was 22 years old.  He states he had a pulse resting of 115 and was syncopal. 

He states he was on the medicines for so long that he quit working so he went to

a cardiologist recently and that is when the above exams happened.  Was on 

Zebeta now on Bystolic which was supposed to stop the PVCs.  He is having 

shortness of breath and palpitations at this time.














I have greeted and performed a rapid initial assessment of this patient.  A 

comprehensive ED assessment and evaluation of the patient, analysis of test 

results and completion of medical decision making process will be conducted by 

an additional ED providers.


TRAVEL OUTSIDE OF THE [place] IN LAST 30 DAYS: No





- Related Data


Allergies/Adverse Reactions: 


                                        





No Known Allergies Allergy (Verified 11/29/18 17:46)


   











Past Medical History





- Past Medical History


Cardiac Medical History: 


   Denies: Hx Atrial Fibrillation, Hx Congestive Heart Failure, Hx Coronary 

Artery Disease, Hx Heart Attack, Hx Hypercholesterolemia, Hx Hypertension, Hx 

Peripheral Vascular Disease, Hx Pulmonary Embolism, Hx Heart Murmur


Pulmonary Medical History: 


   Denies: Hx Asthma, Hx Bronchitis, Hx COPD, Hx Pneumonia, Hx Respiratory Fail

ure, Hx Sleep Apnea, Hx Tuberculosis


Neurological Medical History: Denies: Hx Cerebrovascular Accident, Hx Seizures, 

Hx Parkinson's Disease


Endocrine Medical History: Denies: Hx Graves' Disease, Hx Hyperthyroidism, Hx 

Hypothyroidism


Renal/ Medical History: Denies: Hx Benign Prostatic Hyperplasia, Hx End Stage 

Renal Disease, Hx Kidney Stones, Hx Peritoneal Dialysis


Malignancy Medical History: Denies Hx Leukemia, Denies Hx Lung Cancer


GI Medical History: Denies: Hx Crohn's Disease, Hx Gastroesophageal Reflux 

Disease, Hx Hiatal Hernia, Hx Irritable Bowel, Hx Liver Failure, Hx 

Pancreatitis, Hx Ulcer


Musculoskeltal Medical History: Denies Hx Arthritis, Denies Hx Fibromyalgia, 

Denies Hx Multiple Sclerosis, Denies Hx Muscular Dystrophy, Denies Hx Systemic 

Lupus Erythematosus


Psychiatric Medical History: 


   Denies: Hx Bipolar Disorder, Hx Dementia, Hx Depression, Hx Post Traumatic 

Stress Disorder, Hx Schizophrenia


Traumatic Medical History: Reports: Hx Fractures


Infectious Medical History: Denies: Hx HIV


Past Surgical History: Reports: Hx Oral Surgery, Hx Orthopedic Surgery - t9-t12 

fusion, L3-L5 laminectomy.  Denies: Hx Appendectomy, Hx Bowel Surgery, Hx 

Cholecystectomy, Hx Colostomy, Hx Coronary Artery Bypass Graft, Hx Gastric 

Bypass Surgery, Hx Herniorrhaphy, Hx Pacemaker, Hx Tonsillectomy





- Immunizations


Hx Diphtheria, Pertussis, Tetanus Vaccination: No





Doctor's Discharge





- Discharge


Referrals: 


WILLIAM HERNANDEZ MD [Primary Care Provider] - Follow up as needed

## 2020-01-22 NOTE — ER DOCUMENT REPORT
ED Cardiac





- General


Chief Complaint: Chest Pain


Stated Complaint: CHEST PAIN


Time Seen by Provider: 01/22/20 15:59


Primary Care Provider: 


WILLIAM HERNANDEZ MD [NO LOCAL MD] - Follow up as needed


Mode of Arrival: Ambulatory


Information source: Patient


TRAVEL OUTSIDE OF THE [place] IN LAST 30 DAYS: No





- HPI


Notes: 





Patient presents complaining of left-sided chest pain.  He states that he has a 

history of having chest pain but it is never gone to his arm before.  He states 

today it was going down the left arm which made him concerned and so he came to 

the hospital for evaluation.  He states approximately 9 months ago he had some 

chest pain and had a heart catheterization done in [place].  He states at 

that time he was told that he had some congenital malformations that might 

require stenting but after debate they decided not to stent.  He states he was 

told there is no blockage just congenital malformation.  He states he is arrange

for a second opinion with a cardiologist in [place] because he was told in 

[place] that he did not require any further work-up.  Today when patient got 

the pain he was not sweaty or short of breath.  He has not had nausea.  No 

recent falls or trauma.  No cough cold or congestion.  He has had some sinus 

trouble and is currently on amoxicillin however.





- Related Data


Allergies/Adverse Reactions: 


                                        





No Known Allergies Allergy (Verified 01/22/20 16:02)


   











Past Medical History





- General


Information source: Patient





- Social History


Smoking Status: Former Smoker


Chew tobacco use (# tins/day): No


Frequency of alcohol use: Occasional


Drug Abuse: None


Family History: Reviewed & Not Pertinent


Patient has suicidal ideation: No


Patient has homicidal ideation: No





- Past Medical History


Cardiac Medical History: 


   Denies: Hx Atrial Fibrillation, Hx Congestive Heart Failure, Hx Coronary 

Artery Disease, Hx Heart Attack, Hx Hypercholesterolemia, Hx Hypertension, Hx 

Peripheral Vascular Disease, Hx Pulmonary Embolism, Hx Heart Murmur


Pulmonary Medical History: 


   Denies: Hx Asthma, Hx Bronchitis, Hx COPD, Hx Pneumonia, Hx Respiratory 

Failure, Hx Sleep Apnea, Hx Tuberculosis


Neurological Medical History: Denies: Hx Cerebrovascular Accident, Hx Seizures, 

Hx Parkinson's Disease


Endocrine Medical History: Denies: Hx Graves' Disease, Hx Hyperthyroidism, Hx 

Hypothyroidism


Renal/ Medical History: Denies: Hx Benign Prostatic Hyperplasia, Hx End Stage 

Renal Disease, Hx Kidney Stones, Hx Peritoneal Dialysis


Malignancy Medical History: Denies Hx Leukemia, Denies Hx Lung Cancer


GI Medical History: Denies: Hx Crohn's Disease, Hx Gastroesophageal Reflux 

Disease, Hx Hiatal Hernia, Hx Irritable Bowel, Hx Liver Failure, Hx 

Pancreatitis, Hx Ulcer


Musculoskeletal Medical History: Denies Hx Arthritis, Denies Hx Fibromyalgia, 

Denies Hx Multiple Sclerosis, Denies Hx Muscular Dystrophy, Denies Hx Systemic 

Lupus Erythematosus


Psychiatric Medical History: 


   Denies: Hx Bipolar Disorder, Hx Dementia, Hx Depression, Hx Post Traumatic 

Stress Disorder, Hx Schizophrenia


Traumatic Medical History: Reports: Hx Fractures


Infectious Medical History: Denies: Hx HIV


Past Surgical History: Reports: Hx Oral Surgery, Hx Orthopedic Surgery - t9-t12 

fusion, L3-L5 laminectomy.  Denies: Hx Appendectomy, Hx Bowel Surgery, Hx 

Cholecystectomy, Hx Colostomy, Hx Coronary Artery Bypass Graft, Hx Gastric 

Bypass Surgery, Hx Herniorrhaphy, Hx Pacemaker, Hx Tonsillectomy





- Immunizations


Hx Diphtheria, Pertussis, Tetanus Vaccination: No





Review of Systems





- Review of Systems


Constitutional: denies: Chills, Fever


Cardiovascular: Chest pain, Palpitations


Respiratory: denies: Cough, Short of breath


-: Yes All other systems reviewed and negative





Physical Exam





- Vital signs


Vitals: 





                                        











Temp Pulse Resp BP Pulse Ox


 


 97.9 F   79   18   122/62   79 L


 


 01/22/20 16:00  01/22/20 16:00  01/22/20 16:00  01/22/20 16:00  01/22/20 16:00











Interpretation: Normal





- General


General appearance: Appears well, Alert





- HEENT


Head: Normocephalic, Atraumatic


Eyes: Normal


Pupils: PERRL





- Respiratory


Respiratory status: No respiratory distress


Chest status: Nontender


Breath sounds: Normal


Chest palpation: Normal





- Cardiovascular


Rhythm: Regular


Heart sounds: Normal auscultation


Murmur: No





- Abdominal


Inspection: Normal


Distension: No distension


Bowel sounds: Normal


Tenderness: Nontender


Organomegaly: No organomegaly





- Back


Back: Normal, Nontender





- Extremities


General upper extremity: Normal inspection, Nontender, Normal color, Normal ROM,

Normal temperature


General lower extremity: Normal inspection, Nontender, Normal color, Normal ROM,

Normal temperature, Normal weight bearing.  No: Kylie's sign





- Neurological


Neuro grossly intact: Yes


Cognition: Normal


Orientation: AAOx4


[place] Coma Scale Eye Opening: Spontaneous


[place] Coma Scale Verbal: Oriented


[place] Coma Scale Motor: Obeys Commands


[place] Coma Scale Total: 15


Speech: Normal


Motor strength normal: LUE, RUE, LLE, RLE


Sensory: Normal





- Psychological


Associated symptoms: Normal affect, Normal mood





- Skin


Skin Temperature: Warm


Skin Moisture: Dry


Skin Color: Normal





Course





- Re-evaluation


Re-evalutation: 





01/22/20 21:41


Patient presents with chest pain goes to his left shoulder.  He does have 

chronic intermittent chest pain.  Patient has a cardiology appointment in 2 

weeks.  He had a recent heart catheterization that did not show blockage.  2 

sets of EKGs and troponins here are negative.  Patient has a heart score of 2.





- Vital Signs


Vital signs: 





                                        











Temp Pulse Resp BP Pulse Ox


 


 97.9 F   79   18   122/62   79 L


 


 01/22/20 16:00  01/22/20 16:00  01/22/20 16:00  01/22/20 16:00  01/22/20 16:00














- Laboratory


Result Diagrams: 


                                 01/22/20 16:25





                                 01/22/20 16:25


Laboratory results interpreted by me: 





                                        











  01/22/20 01/22/20





  16:25 16:25


 


RBC  4.32 L 


 


Hgb  13.1 L 


 


Total Protein   8.3 H














- Diagnostic Test


Radiology reviewed: Image reviewed, Reports reviewed





- EKG Interpretation by Me


EKG shows normal: Sinus rhythm


Rate: Normal - 68


Rhythm: NSR, PVC's


Axis/QRS: No: Right axis deviation, Left axis deviation





Discharge





- Discharge


Clinical Impression: 


 Chest pain at rest





Condition: Stable


Disposition: HOME, SELF-CARE


Instructions:  Chest Pain of Unclear Cause ([facility])


Additional Instructions: 


Please follow-up with the cardiologist as scheduled


Forms:  Return to Work


Referrals: 


WILLIAM HERNANDEZ MD [NO LOCAL MD] - Follow up as needed

## 2020-01-22 NOTE — EKG REPORT
SEVERITY:- OTHERWISE NORMAL ECG -

SINUS RHYTHM

VENTRICULAR PREMATURE COMPLEX

:

Confirmed by: Armin Snow MD 22-Jan-2020 16:29:39